# Patient Record
Sex: MALE | Race: WHITE | Employment: FULL TIME | URBAN - METROPOLITAN AREA
[De-identification: names, ages, dates, MRNs, and addresses within clinical notes are randomized per-mention and may not be internally consistent; named-entity substitution may affect disease eponyms.]

---

## 2018-11-17 ENCOUNTER — HOSPITAL ENCOUNTER (EMERGENCY)
Age: 28
Discharge: HOME | End: 2018-11-17
Attending: EMERGENCY MEDICINE
Payer: SELF-PAY

## 2018-11-17 VITALS
SYSTOLIC BLOOD PRESSURE: 135 MMHG | RESPIRATION RATE: 20 BRPM | TEMPERATURE: 98 F | HEIGHT: 68 IN | HEART RATE: 73 BPM | OXYGEN SATURATION: 100 % | DIASTOLIC BLOOD PRESSURE: 74 MMHG | WEIGHT: 160 LBS | BODY MASS INDEX: 24.25 KG/M2

## 2018-11-17 DIAGNOSIS — L02.91 ABSCESS: Primary | ICD-10-CM

## 2018-11-17 PROCEDURE — 74011250637 HC RX REV CODE- 250/637: Performed by: EMERGENCY MEDICINE

## 2018-11-17 PROCEDURE — 77030019895 HC PCKNG STRP IODO -A

## 2018-11-17 PROCEDURE — 99283 EMERGENCY DEPT VISIT LOW MDM: CPT | Performed by: EMERGENCY MEDICINE

## 2018-11-17 PROCEDURE — 75810000289 HC I&D ABSCESS SIMP/COMP/MULT: Performed by: EMERGENCY MEDICINE

## 2018-11-17 RX ORDER — TRAMADOL HYDROCHLORIDE 50 MG/1
100 TABLET ORAL
Status: COMPLETED | OUTPATIENT
Start: 2018-11-17 | End: 2018-11-17

## 2018-11-17 RX ORDER — IBUPROFEN 800 MG/1
800 TABLET ORAL
Status: COMPLETED | OUTPATIENT
Start: 2018-11-17 | End: 2018-11-17

## 2018-11-17 RX ORDER — CLINDAMYCIN HYDROCHLORIDE 150 MG/1
300 CAPSULE ORAL
Status: COMPLETED | OUTPATIENT
Start: 2018-11-17 | End: 2018-11-17

## 2018-11-17 RX ORDER — MUPIROCIN 20 MG/G
OINTMENT TOPICAL 2 TIMES DAILY
Qty: 22 G | Refills: 0 | Status: SHIPPED | OUTPATIENT
Start: 2018-11-17

## 2018-11-17 RX ORDER — CLINDAMYCIN HYDROCHLORIDE 300 MG/1
300 CAPSULE ORAL 4 TIMES DAILY
Qty: 28 CAP | Refills: 0 | Status: SHIPPED | OUTPATIENT
Start: 2018-11-17 | End: 2018-11-24

## 2018-11-17 RX ADMIN — TRAMADOL HYDROCHLORIDE 100 MG: 50 TABLET, FILM COATED ORAL at 23:11

## 2018-11-17 RX ADMIN — IBUPROFEN 800 MG: 800 TABLET, FILM COATED ORAL at 23:11

## 2018-11-17 RX ADMIN — CLINDAMYCIN HYDROCHLORIDE 300 MG: 150 CAPSULE ORAL at 23:11

## 2018-11-18 NOTE — ED PROVIDER NOTES
Chief complaint : abscess HISTORY OF PRESENT ILLNESS : 
Location : left lateral calf Quality : aching Quantity : constant, enlarging Timing : 2 weeks Severity : moderate/worsening Alleviating / exacerbating factors : recurrent boils to lower extremities Associated Symptoms :  
 
 
 
 
  
 
History reviewed. No pertinent past medical history. History reviewed. No pertinent surgical history. History reviewed. No pertinent family history. Social History Socioeconomic History  Marital status:  Spouse name: Not on file  Number of children: Not on file  Years of education: Not on file  Highest education level: Not on file Social Needs  Financial resource strain: Not on file  Food insecurity - worry: Not on file  Food insecurity - inability: Not on file  Transportation needs - medical: Not on file  Transportation needs - non-medical: Not on file Occupational History  Not on file Tobacco Use  Smoking status: Not on file Substance and Sexual Activity  Alcohol use: Not on file  Drug use: Not on file  Sexual activity: Not on file Other Topics Concern  Not on file Social History Narrative  Not on file ALLERGIES: Patient has no known allergies. Review of Systems Constitutional: Negative for chills and fever. Musculoskeletal: Positive for myalgias. Skin: Positive for color change and rash. Vitals:  
 11/17/18 2039 BP: 136/71 Pulse: 73 Resp: 20 Temp: 97.7 °F (36.5 °C) SpO2: 99% Weight: 72.6 kg (160 lb) Height: 5' 8\" (1.727 m) Physical Exam  
Constitutional: She is oriented to person, place, and time. She appears well-developed and well-nourished. No distress. HENT:  
Head: Normocephalic and atraumatic. Eyes: Conjunctivae and EOM are normal. Right eye exhibits no discharge. Left eye exhibits no discharge. Neck: Normal range of motion. Neck supple. Pulmonary/Chest: Effort normal. No respiratory distress. Musculoskeletal: Normal range of motion. She exhibits tenderness. Left lower leg: She exhibits tenderness. Legs: 
Neurological: She is alert and oriented to person, place, and time. She has normal strength. She exhibits normal muscle tone. cni 2-12 grossly Nl gait, Nl speech Skin: Skin is warm and dry. No rash noted. She is not diaphoretic. Psychiatric: She has a normal mood and affect. Her behavior is normal.  
Nursing note and vitals reviewed. MDM Number of Diagnoses or Management Options Abscess:  
Diagnosis management comments: Medical decision making note: 
Recurrent abscess, probably MRSA Feels better after I&D Attempt MRSA eradication with clindamycin, Hibiclens scrubs twice a day, and Bactroban to the nostrils twice a day This concludes the \"medical decision making note\" part of this emergency department visit note. I&D Abcess Complex Date/Time: 11/17/2018 11:23 PM 
Performed by: Maite Brooks MD 
Authorized by: Maite Brooks MD  
 
Consent:  
  Consent obtained:  Verbal 
  Risks discussed:  Bleeding Alternatives discussed:  No treatment Location:  
  Type:  Abscess Location:  Lower extremity Lower extremity location:  Leg Leg location:  L lower leg Pre-procedure details:  
  Skin preparation:  Betadine Anesthesia (see MAR for exact dosages): Anesthesia method:  Local infiltration Local anesthetic:  Lidocaine 1% w/o epi Procedure type:  
  Complexity:  Complex Procedure details:  
  Incision types:  Single straight Incision depth:  Subcutaneous Scalpel blade:  11 Wound management:  Probed and deloculated and irrigated with saline Drainage:  Purulent Drainage amount: Moderate Wound treatment:  Wound left open Packing materials:  Rina Amish placed Post-procedure details:  
  Patient tolerance of procedure: Tolerated well, no immediate complications

## 2018-11-18 NOTE — ED NOTES
I have reviewed discharge instructions with the patient. The patient verbalized understanding. Patient left ED via Discharge Method: ambulatory to Home with family. Opportunity for questions and clarification provided. Patient given 2 scripts. To continue your aftercare when you leave the hospital, you may receive an automated call from our care team to check in on how you are doing. This is a free service and part of our promise to provide the best care and service to meet your aftercare needs.  If you have questions, or wish to unsubscribe from this service please call [phone]. Thank you for Choosing our [facility] Emergency Department.

## 2018-11-18 NOTE — DISCHARGE INSTRUCTIONS
Get the mike \" Good Rx \" for your smart phone,  Use it to search your postal zip-code, and prescription and it will find the cheapest place to get the medicine in question    Get some hibicleanse surgical soap from the pharmacy  Use it twice a day. -   shower and lather up head to toe, and let it sit for 5 minuites  Then complete shower and rinse off    bactroban to nostrils twiec  A day    Clindamycin foir 1 week,  Remove dressing after 48 hours             Skin Abscess: Care Instructions  Your Care Instructions    A skin abscess is a bacterial infection that forms a pocket of pus. A boil is a kind of skin abscess. The doctor may have cut an opening in the abscess so that the pus can drain out. You may have gauze in the cut so that the abscess will stay open and keep draining. You may need antibiotics. You will need to follow up with your doctor to make sure the infection has gone away. The doctor has checked you carefully, but problems can develop later. If you notice any problems or new symptoms, get medical treatment right away. Follow-up care is a key part of your treatment and safety. Be sure to make and go to all appointments, and call your doctor if you are having problems. It's also a good idea to know your test results and keep a list of the medicines you take. How can you care for yourself at home? · Apply warm and dry compresses, a heating pad set on low, or a hot water bottle 3 or 4 times a day for pain. Keep a cloth between the heat source and your skin. · If your doctor prescribed antibiotics, take them as directed. Do not stop taking them just because you feel better. You need to take the full course of antibiotics. · Take pain medicines exactly as directed. ? If the doctor gave you a prescription medicine for pain, take it as prescribed. ? If you are not taking a prescription pain medicine, ask your doctor if you can take an over-the-counter medicine. · Keep your bandage clean and dry. Change the bandage whenever it gets wet or dirty, or at least one time a day. · If the abscess was packed with gauze:  ? Keep follow-up appointments to have the gauze changed or removed. If the doctor instructed you to remove the gauze, follow the instructions you were given for how to remove it. ? After the gauze is removed, soak the area in warm water for 15 to 20 minutes 2 times a day, until the wound closes. When should you call for help? Call your doctor now or seek immediate medical care if:    · You have signs of worsening infection, such as:  ? Increased pain, swelling, warmth, or redness. ? Red streaks leading from the infected skin. ? Pus draining from the wound. ? A fever.    Watch closely for changes in your health, and be sure to contact your doctor if:    · You do not get better as expected. Where can you learn more? Go to http://cristóbal-maryam.info/. Enter Z622 in the search box to learn more about \"Skin Abscess: Care Instructions. \"  Current as of: April 18, 2018  Content Version: 11.8  © 3749-5203 [employer]. Care instructions adapted under license by [employer] (which disclaims liability or warranty for this information). If you have questions about a medical condition or this instruction, always ask your healthcare professional. [address] any warranty or liability for your use of this information.

## 2020-02-28 ENCOUNTER — HOSPITAL ENCOUNTER (EMERGENCY)
Age: 30
Discharge: HOME | End: 2020-02-28
Attending: EMERGENCY MEDICINE
Payer: SELF-PAY

## 2020-02-28 VITALS
TEMPERATURE: 98 F | RESPIRATION RATE: 20 BRPM | HEIGHT: 68 IN | SYSTOLIC BLOOD PRESSURE: 137 MMHG | WEIGHT: 166 LBS | OXYGEN SATURATION: 96 % | BODY MASS INDEX: 25.16 KG/M2 | HEART RATE: 91 BPM | DIASTOLIC BLOOD PRESSURE: 68 MMHG

## 2020-02-28 DIAGNOSIS — R19.09 NODULE OF GROIN: Primary | ICD-10-CM

## 2020-02-28 PROCEDURE — 99283 EMERGENCY DEPT VISIT LOW MDM: CPT

## 2020-02-28 PROCEDURE — 74011250637 HC RX REV CODE- 250/637: Performed by: EMERGENCY MEDICINE

## 2020-02-28 RX ORDER — SULFAMETHOXAZOLE AND TRIMETHOPRIM 800; 160 MG/1; MG/1
1 TABLET ORAL 2 TIMES DAILY
Qty: 14 TAB | Refills: 0 | Status: SHIPPED | OUTPATIENT
Start: 2020-02-28 | End: 2020-03-06

## 2020-02-28 RX ORDER — SULFAMETHOXAZOLE AND TRIMETHOPRIM 800; 160 MG/1; MG/1
1 TABLET ORAL
Status: COMPLETED | OUTPATIENT
Start: 2020-02-28 | End: 2020-02-28

## 2020-02-28 RX ADMIN — SULFAMETHOXAZOLE AND TRIMETHOPRIM 1 TABLET: 800; 160 TABLET ORAL at 20:54

## 2020-02-29 NOTE — DISCHARGE INSTRUCTIONS
As we discussed, based on the redness and tenderness of the nodule I suspect this is most likely related to an infection and should get better on antibiotics. If your symptoms or not getting better, you may need an ultrasound when you follow-up with the surgeon that you were referred to today.   If you are having increasing redness, fevers, or any other concerns return to the ER for further evaluation

## 2020-02-29 NOTE — ED NOTES
I have reviewed discharge instructions with the patient. The patient verbalized understanding. Patient left ED via Discharge Method: ambulatory to Home with family via pov. Opportunity for questions and clarification provided. Patient given 1 scripts. To continue your aftercare when you leave the hospital, you may receive an automated call from our care team to check in on how you are doing. This is a free service and part of our promise to provide the best care and service to meet your aftercare needs.  If you have questions, or wish to unsubscribe from this service please call [phone]. Thank you for Choosing our [facility] Emergency Department.

## 2020-02-29 NOTE — ED PROVIDER NOTES
Anderson Bernard is a 34 y.o. adult seen on 2/28/2020 at 8:31 PM in the [facility] EMERGENCY DEPT in room ER01/01. Chief Complaint   Patient presents with    Hernia (Non Specific)     HPI: 51-year-old male presenting to the emergency department complaining of a tender red nodule in his left groin region that is been present for the last 3 days. He reports a history of multiple skin infections in the past.  He states that he had some initial discomfort and some increased swelling. He went to a doctor today who told him that he needed to be seen by a surgeon and that it could be a hernia or an abscess or possibly cancer. Patient denies any other symptoms. Patient    Historian:     REVIEW OF SYSTEMS     Review of Systems   Constitutional: Negative for fever. HENT: Negative. Eyes: Negative. Respiratory: Negative for cough, chest tightness, shortness of breath and wheezing. Cardiovascular: Negative for chest pain. Gastrointestinal: Negative for abdominal distention, abdominal pain, constipation, diarrhea and vomiting. Endocrine: Negative. Genitourinary: Negative for dysuria, flank pain, frequency and urgency. Skin: Positive for color change. Neurological: Negative for dizziness, syncope and headaches. Psychiatric/Behavioral: Negative. All other systems reviewed and are negative. PAST MEDICAL HISTORY     History reviewed. No pertinent past medical history. History reviewed. No pertinent surgical history. Social History     Socioeconomic History    Marital status:      Spouse name: Not on file    Number of children: Not on file    Years of education: Not on file    Highest education level: Not on file   Tobacco Use    Smoking status: Never Smoker    Smokeless tobacco: Never Used   Substance and Sexual Activity    Alcohol use:  Yes    Drug use: Never     Prior to Admission Medications   Prescriptions Last Dose Informant Patient Reported? Taking?   mupirocin (BACTROBAN) 2 % ointment   No No   Sig: by Both Nostrils route two (2) times a day. Apply inside each nostril twice a day for 5 days      Facility-Administered Medications: None     No Known Allergies     PHYSICAL EXAM       Vitals:    02/28/20 1848   BP: 163/81   Pulse: (!) 112   Resp: 20   Temp: 98 °F (36.7 °C)   SpO2: 98%    Vital signs were reviewed. Physical Exam  Vitals signs reviewed. Constitutional:       General: She is not in acute distress. Appearance: She is well-developed. HENT:      Head: Normocephalic and atraumatic. Eyes:      Pupils: Pupils are equal, round, and reactive to light. Neck:      Musculoskeletal: Normal range of motion. Pulmonary:      Effort: Pulmonary effort is normal.   Genitourinary:      Musculoskeletal: Normal range of motion. Neurological:      Mental Status: She is alert and oriented to person, place, and time. Psychiatric:         Behavior: Behavior normal.          MEDICAL DECISION MAKING     ED Course: On exam this does not appear consistent with abscess, though there does appear to be some induration present it could represent early abscess formation. We also discussed the possibility of large lymph node. However given that this is acute over the last 3 days and it is tender I think this is less likely to represent an oncologic process. Will treat with antibiotics, the patient did have follow-up with the surgeon arranged by his primary care doctor today. I have asked him to follow-up with  to check to make sure that his symptoms are improving. Return to the ER for any worsening of symptoms. I do not think any imaging is indicated at this time. Disposition:  Dc to home  Diagnosis:  Nodule left groin  ____________________________________________________________________  A portion of this note was generated using voice recognition dictation software.  While the note has been reviewed for accuracy, please note certain words and phrases may not be transcribed as intended and some grammatical and/or typographical errors may be present.

## 2023-09-25 ENCOUNTER — HOSPITAL ENCOUNTER (EMERGENCY)
Age: 33
Discharge: HOME | End: 2023-09-25
Attending: GENERAL PRACTICE

## 2023-09-25 VITALS
HEART RATE: 97 BPM | DIASTOLIC BLOOD PRESSURE: 78 MMHG | TEMPERATURE: 98.6 F | RESPIRATION RATE: 15 BRPM | OXYGEN SATURATION: 100 % | SYSTOLIC BLOOD PRESSURE: 148 MMHG | WEIGHT: 170 LBS | BODY MASS INDEX: 26.68 KG/M2 | HEIGHT: 67 IN

## 2023-09-25 DIAGNOSIS — L03.314 CELLULITIS OF LEFT GROIN: Primary | ICD-10-CM

## 2023-09-25 PROCEDURE — 99283 EMERGENCY DEPT VISIT LOW MDM: CPT

## 2023-09-25 RX ORDER — DULAGLUTIDE 3 MG/.5ML
3 INJECTION, SOLUTION SUBCUTANEOUS WEEKLY
COMMUNITY
Start: 2023-02-28

## 2023-09-25 RX ORDER — SULFAMETHOXAZOLE AND TRIMETHOPRIM 800; 160 MG/1; MG/1
1 TABLET ORAL 2 TIMES DAILY
Qty: 20 TABLET | Refills: 0 | Status: SHIPPED | OUTPATIENT
Start: 2023-09-25 | End: 2023-10-05

## 2023-09-25 ASSESSMENT — ENCOUNTER SYMPTOMS
GASTROINTESTINAL NEGATIVE: 1
COLOR CHANGE: 1
RESPIRATORY NEGATIVE: 1

## 2023-09-25 ASSESSMENT — PAIN SCALES - GENERAL: PAINLEVEL_OUTOF10: 0

## 2023-09-25 ASSESSMENT — PAIN - FUNCTIONAL ASSESSMENT: PAIN_FUNCTIONAL_ASSESSMENT: 0-10

## 2023-09-25 NOTE — ED PROVIDER NOTES
Emergency Department Provider Note       PCP: No primary care provider on file. Age: 34 y.o. Sex: adult     DISPOSITION Decision To Discharge 09/25/2023 05:23:06 PM       ICD-10-CM    1. Cellulitis of left groin  L03.314           Medical Decision Making     Complexity of Problems Addressed:  1 or more acute illnesses that pose a threat to life or bodily function. Data Reviewed and Analyzed:  I independently ordered and reviewed each unique test.  I reviewed external records: previous lab results from outside ED. Discussion of management or test interpretation. Patient is 68-year-old male who presents with few days of left groin pain that feels like prior infection. He says he had these in the past and does well with Bactrim. Denies any testicular pain or trouble with urination but reports compliance with medication. History of insulin-dependent diabetes. Last A1c was 7.8 in May. Mild tenderness. No crepitus or significant tenderness. No fluctuance to suggest abscess. He is well in appearance and nontoxic in appearance. Offered ultrasound and further work-up but patient declined and just wants antibiotics. He will return if he worsens. Risk of Complications and/or Morbidity of Patient Management:  Prescription drug management performed. Shared medical decision making was utilized in creating the patients health plan today. History       Patient is a 68-year-old male history of type 1 diabetes who presents with pain to the left groin. He says it feels like prior infection that he has had in the past.  He denies any testicular pain. No fevers or systemic symptoms. No urinary complaints. He says his blood sugar has been \"fine\". Last A1c was in the 7 range a couple months ago. He has had similar symptoms in the past and says he does fine with antibiotics. Review of Systems   Constitutional: Negative. HENT: Negative. Respiratory: Negative.

## 2023-09-25 NOTE — ED TRIAGE NOTES
Pt ambulatory to triage with c/o possible staph infection to left groin area and near scrotum. States he is a diabetic and has had infection before.  PA to triage for exam.